# Patient Record
Sex: MALE | Race: WHITE | NOT HISPANIC OR LATINO | URBAN - METROPOLITAN AREA
[De-identification: names, ages, dates, MRNs, and addresses within clinical notes are randomized per-mention and may not be internally consistent; named-entity substitution may affect disease eponyms.]

---

## 2024-02-11 ENCOUNTER — EMERGENCY (EMERGENCY)
Facility: HOSPITAL | Age: 34
LOS: 1 days | Discharge: HOME | End: 2024-02-11
Attending: EMERGENCY MEDICINE | Admitting: EMERGENCY MEDICINE
Payer: COMMERCIAL

## 2024-02-11 VITALS
HEIGHT: 72 IN | WEIGHT: 175.05 LBS | SYSTOLIC BLOOD PRESSURE: 155 MMHG | DIASTOLIC BLOOD PRESSURE: 95 MMHG | RESPIRATION RATE: 18 BRPM | HEART RATE: 90 BPM | TEMPERATURE: 98 F | OXYGEN SATURATION: 95 %

## 2024-02-11 DIAGNOSIS — Z01.89 ENCOUNTER FOR OTHER SPECIFIED SPECIAL EXAMINATIONS: ICD-10-CM

## 2024-02-11 PROCEDURE — 99283 EMERGENCY DEPT VISIT LOW MDM: CPT

## 2024-02-11 PROCEDURE — 99282 EMERGENCY DEPT VISIT SF MDM: CPT

## 2024-02-11 NOTE — ED ADULT NURSE NOTE - OBJECTIVE STATEMENT
Pt reports need medical clearance before admitted to sober living facility. Reports IV cocaine use, last use 1hour PTA.   Denies any discomfort at this time.  Pt is alert and oriented, A&O4, steady gait noted.

## 2024-02-11 NOTE — ED PROVIDER NOTE - NSFOLLOWUPINSTRUCTIONS_ED_ALL_ED_FT
Cocaine Use Disorder  Cocaine use disorder is a condition in which your cocaine use disrupts your daily life. Cocaine belongs to a group of powerful drugs known as stimulants. Other names for cocaine include coke, crack, blow, snow, C, powder, and nose candy. This drug has some medical uses, but these are rare. Cocaine is most often misused because of its effects, which include:  A feeling of extreme pleasure (euphoria).  Alertness.  A high energy level.  Cocaine use disorder can be dangerous because:  Cocaine increases your blood pressure.  Using cocaine can lead to a heart attack or stroke.  Cocaine use can cause fast or irregular heartbeats and seizures.  Non-medical cocaine use is illegal and can lead to criminal charges.  Cocaine use can be life-threatening.    What are the causes?  This condition is caused by misusing cocaine. Many people start using cocaine because it makes them feel good or helps them deal with their lives. Over time, they get addicted to it. When they try to stop using it, they feel sick.    What increases the risk?  The following factors may make you more likely to develop this condition:  Misusing other drugs.  A family history of misusing drugs.  History of mental illness.  What are the signs or symptoms?  Symptoms of this condition include:  Craving cocaine, which can lead to:  Using more cocaine than you want to, or using cocaine for longer than you want to.  Being unable to slow down or stop your cocaine use.  Needing more and more cocaine to get the same effects (building up a tolerance).  Spending a lot of time getting cocaine, using it, or recovering from its effects.  Having problems at work, at school, at home, or with relationships because of cocaine use.  Giving up or cutting down on important life activities because of cocaine use.  Using cocaine when it is dangerous, such as when driving a car.  Continuing to use cocaine even though it has led to physical problems, such as:  Poor nutrition.  Nosebleeds.  Chest pain.  Continuing to use cocaine even though it is affecting your mental health. You may develop:  Schizophrenia-like symptoms. You may think or act in an unusual way.  Depression.  Bipolar mood swings.  Anxiety.  Sleep problems.  If you stop using cocaine, you may have symptoms of withdrawal, such as:  Depression.  Bad dreams or sleep problems.  Tiredness (fatigue) or slowed thinking.  Increased appetite.  How is this diagnosed?  This condition is diagnosed based on:  A physical exam.  Your history of cocaine use.  Your symptoms. This includes:  How cocaine use affects your life.  Changes in personality, behaviors, and mood.  Health or mental health issues related to using cocaine.  Blood or urine tests to screen for drugs.  How is this treated?  A person talking with a counselor.  The first goal of treatment is to stop your use of cocaine. This must be done safely and may involve:  Taking part in group and individual counseling from specially trained mental health providers.  Staying at a residential treatment center for several days or weeks.  Attending daily counseling sessions at a treatment center.  Taking medicines as told by your health care provider that:  Ease symptoms and prevent complications during withdrawal.  Block cravings and block the good feeling that you get from using cocaine.  Treat other mental health issues, such as depression or anxiety.  Participating in a support group to share your experience with others who are going through the same thing.  Recovery can be a long process. Many people who undergo treatment start using the drug again after stopping (relapse). If you relapse, it does not mean that treatment will not work.    Follow these instructions at home:  Medicines    Take over-the-counter and prescription medicines only as told by your health care provider.  Check with your health care provider before starting any new medicines, vitamins, herbs, or supplements.  General instructions    A sign showing that a person should not drink alcohol.  A sign telling a person not to smoke.  Do not use any drugs or alcohol.  Do not use any products that contain nicotine or tobacco. These products include cigarettes, chewing tobacco, and vaping devices, such as e-cigarettes. If you need help quitting, ask your health care provider.  Avoid people and activities that trigger your use of cocaine.  Learn and practice techniques for managing stress.  Have a plan for vulnerable moments. Get phone numbers of those who are willing to help and who are committed to your recovery.  Attend support groups regularly for emotional support, advice, and guidance.  Keep all follow-up visits. This is important for recovery and includes continuing to work with therapists and support groups.  Where to find more information  National [place] on Drug Abuse: eleanor.nih.gov  Substance Abuse and Mental Health Services Administration: samhsa.gov  Narcotics Anonymous: na.org  Contact a health care provider if:  Your symptoms get worse.  You use cocaine again.  You cannot take your medicines as told.  Get help right away if you have:  Chest pain.  Any symptoms of a stroke. "BE FAST" is an easy way to remember the main warning signs of a stroke:  B - Balance. Signs are dizziness, sudden trouble walking, or loss of balance.  E - Eyes. Signs are trouble seeing or a sudden change in vision.  F - Face. Signs are sudden weakness or numbness of the face, or the face or eyelid drooping on one side.  A - Arms. Signs are weakness or numbness in an arm. This happens suddenly and usually on one side of the body.  S - Speech. Signs are sudden trouble speaking, slurred speech, or trouble understanding what people say.  T - Time. Time to call emergency services. Write down what time symptoms started.  Other signs of a stroke, such as:  A sudden, severe headache with no known cause.  Nausea or vomiting.  Seizure.  These symptoms may be an emergency. Get help right away. Call 911.  Do not wait to see if the symptoms will go away.  Do not drive yourself to the hospital.  Also, get help right away if:  You have serious thoughts about hurting yourself or others.  Take one of these steps if you feel like you may hurt yourself or others, or have thoughts about taking your own life:  Go to your nearest emergency room.  Call 911.  Call the National Suicide Prevention Lifeline at 1-523.378.2688 or 085. This is open 24 hours a day.  Text the Crisis Text Line at 161244.  Summary  Cocaine has some medical uses, but these are rare. Cocaine is most often misused because of its effects.  Many people start using cocaine because it makes them feel good or helps them deal with their lives. Over time, they get addicted to it.  Treatment for this condition is usually provided by specially trained mental health providers.  This information is not intended to replace advice given to you by your health care provider. Make sure you discuss any questions you have with your health care provider.

## 2024-02-11 NOTE — ED ADULT TRIAGE NOTE - CHIEF COMPLAINT QUOTE
Pt reports "I need medical clearance before I can be admitted to a sober living facility." Pt reports cocaine use, last use 1 hour PTA. Pt denies any symptoms at this time.

## 2024-02-11 NOTE — ED PROVIDER NOTE - PHYSICAL EXAMINATION
VITAL SIGNS: I have reviewed nursing notes and confirm.  CONSTITUTIONAL: Well appearing, in no acute distress.   SKIN:  warm and dry, no acute rash.   HEAD:  normocephalic, atraumatic.  EYES: EOM intact; conjunctiva and sclera clear.  ENT: No nasal discharge; airway clear.   NECK: Supple.  CARD: S1, S2, Regular rate and rhythm.   RESP:  Clear to auscultation b/l, no wheezes, rales or rhonchi.  ABD: Normal bowel sounds; soft; non-distended; non-tender; no guarding/ rebound.  EXT: Normal ROM. No peripheral edema. Pulses intact and equal b/l. multiple track marks in upper lower extremities no erythema no swelling no cellulitis induration or fluctuance  NEURO: Alert, oriented, grossly unremarkable  PSYCH: Cooperative, mood and affect appropriate.

## 2024-02-11 NOTE — ED PROVIDER NOTE - OBJECTIVE STATEMENT
33-year-old male without significant past medical history injecting cocaine for past 3 days here for medical clearance for rehab facility.  Denies any medical complaints denies any redness or swelling or fevers or abscesses to injection sites.  No chest pain shortness of breath no abdominal pain.  No nausea vomiting diarrhea.

## 2024-02-11 NOTE — ED ADULT NURSE NOTE - NSFALLUNIVINTERV_ED_ALL_ED
Bed/Stretcher in lowest position, wheels locked, appropriate side rails in place/Call bell, personal items and telephone in reach/Instruct patient to call for assistance before getting out of bed/chair/stretcher/Non-slip footwear applied when patient is off stretcher/[place] to call system/Physically safe environment - no spills, clutter or unnecessary equipment/Purposeful proactive rounding/Room/bathroom lighting operational, light cord in reach

## 2024-02-11 NOTE — ED PROVIDER NOTE - PATIENT PORTAL LINK FT
You can access the FollowMyHealth Patient Portal offered by [facility] by registering at the following website: [email or web address]. By joining [facility]’s FollowMyHealth portal, you will also be able to view your health information using other applications (apps) compatible with our system.

## 2024-02-11 NOTE — ED PROVIDER NOTE - ADDITIONAL NOTES AND INSTRUCTIONS:
Patient was medically stable at the time of my evaluation has no medical complaints and is well-appearing on exam.  DO NICK AbarcaEM

## 2024-02-11 NOTE — ED PROVIDER NOTE - CLINICAL SUMMARY MEDICAL DECISION MAKING FREE TEXT BOX
33-year-old male medically stable requiring clearance for rehabilitation initiation patient has no medical complaints is well-appearing on exam has some track marks but none of which are infected no abscesses or cellulitis 33-year-old male medically stable requiring clearance for rehabilitation initiation patient has no medical complaints is well-appearing on exam has some track marks but none of which are infected no abscesses or cellulitis, vss well appearing stable for dc